# Patient Record
Sex: MALE | Race: WHITE | NOT HISPANIC OR LATINO | URBAN - METROPOLITAN AREA
[De-identification: names, ages, dates, MRNs, and addresses within clinical notes are randomized per-mention and may not be internally consistent; named-entity substitution may affect disease eponyms.]

---

## 2019-11-06 ENCOUNTER — TELEPHONE (OUTPATIENT)
Dept: UROLOGY | Facility: CLINIC | Age: 84
End: 2019-11-06

## 2019-11-06 NOTE — TELEPHONE ENCOUNTER
Spoke with pt and pt verbalized concern in regards to getting suprapubic mosquera changed per standing order once a month. Pt has exhibiting appointment approximately 2 months from now but showed concern for waiting too long. Changed pt appointment to a sooner date next week to have mosquera changed.

## 2019-11-06 NOTE — TELEPHONE ENCOUNTER
----- Message from Stephanie Cooper sent at 11/5/2019  3:27 PM CST -----  Contact: pt   Type:  Sooner Apoointment Request    Caller is requesting a sooner appointment.  Caller declined first available appointment listed below.  Caller will not accept being placed on the waitlist and is requesting a message be sent to doctor.  Name of Caller:pt   When is the first available appointment?11/22/2019  Symptoms:need to get catheter change   Would the patient rather a call back or a response via [facility]? Call back   Best Call Back Number:[phone]   Additional Information:

## 2019-11-08 ENCOUNTER — TELEPHONE (OUTPATIENT)
Dept: UROLOGY | Facility: CLINIC | Age: 84
End: 2019-11-08

## 2019-11-08 NOTE — TELEPHONE ENCOUNTER
----- Message from Awilda Cox sent at 11/7/2019 12:03 PM CST -----  Contact: Home Health Nurse-Mr. Morrow  Would like to consult with nurse regarding results from urine. Mr. Morrow states may be a possible UTI. Please call back at [phone]

## 2019-11-22 ENCOUNTER — PROCEDURE VISIT (OUTPATIENT)
Dept: UROLOGY | Facility: CLINIC | Age: 84
End: 2019-11-22
Payer: MEDICARE

## 2019-11-22 DIAGNOSIS — Z43.5 ENCOUNTER FOR CARE OR REPLACEMENT OF SUPRAPUBIC TUBE: Primary | ICD-10-CM

## 2019-11-22 DIAGNOSIS — N31.9 NEUROGENIC BLADDER: ICD-10-CM

## 2019-11-22 PROCEDURE — 51710 CYSTOSCOPY: ICD-10-PCS | Mod: S$GLB,,, | Performed by: SPECIALIST

## 2019-11-22 PROCEDURE — 51710 CHANGE OF BLADDER TUBE: CPT | Mod: S$GLB,,, | Performed by: SPECIALIST

## 2019-11-22 NOTE — PROGRESS NOTES
"Cystoscopy  Date/Time: 11/22/2019 1:40 PM  Performed by: Francisco Kee MD  Authorized by: Francisco Kee MD     Consent Done?:  Yes (Written)  Time out: Immediately prior to procedure a "time out" was called to verify the correct patient, procedure, equipment, support staff and site/side marked as required.    Position:  Supine  Anesthesia:  Intraurethral instillation  Preparation: Patient was prepped and draped in usual sterile fashion      Scope type:  Flexible cystoscope  Urology - Stent Inserted: Suprapubic catheter inserted.    Bladder normal: Yes      Patient tolerance:  Patient tolerated the procedure well with no immediate complications     We perform cystoscopic evaluation of the bladder through a suprapubic tract.  Passed a wire into the bladder and advanced a Councill tip catheter over the wire.          "

## 2019-11-22 NOTE — PROCEDURES
"Cystoscopy  Date/Time: 11/22/2019 1:40 PM  Performed by: Francisco Kee MD  Authorized by: Francisco Kee MD     Consent Done?:  Yes (Written)  Time out: Immediately prior to procedure a "time out" was called to verify the correct patient, procedure, equipment, support staff and site/side marked as required.    Position:  Supine  Anesthesia:  Intraurethral instillation  Preparation: Patient was prepped and draped in usual sterile fashion      Scope type:  Flexible cystoscope  Stent inserted: No    External exam normal: Yes    Normal bladder: Cystoscopy was performed through a suprapubic tract.      Patient tolerance:  Patient tolerated the procedure well with no immediate complications     The flexible 16.5 Guyanese scope was introduced through a suprapubic tract.  The tract was trended.  We got into the bladder.  We advanced a wire into the bladder. Scope was removed.  At this time a Councill tip catheter was advanced over the wire directly into the bladder. It was inflated.  He tolerated procedure no complications.      "

## 2020-01-03 ENCOUNTER — CLINICAL SUPPORT (OUTPATIENT)
Dept: UROLOGY | Facility: CLINIC | Age: 85
End: 2020-01-03
Payer: MEDICARE

## 2020-01-03 PROCEDURE — 99499 NO LOS: ICD-10-PCS | Mod: S$GLB,,, | Performed by: SPECIALIST

## 2020-01-03 PROCEDURE — 51702 PR INSERTION OF TEMPORARY INDWELLING BLADDER CATHETER, SIMPLE: ICD-10-PCS | Mod: S$GLB,,, | Performed by: SPECIALIST

## 2020-01-03 PROCEDURE — 51702 INSERT TEMP BLADDER CATH: CPT | Mod: S$GLB,,, | Performed by: SPECIALIST

## 2020-01-03 PROCEDURE — 99499 UNLISTED E&M SERVICE: CPT | Mod: S$GLB,,, | Performed by: SPECIALIST

## 2020-01-03 NOTE — PROGRESS NOTES
16 F SP mosquera change. Patient tolerated well. Educated patient on aftercare. Patient verbalizes understanding. Area clean, intact, and dry.

## 2020-02-03 ENCOUNTER — CLINICAL SUPPORT (OUTPATIENT)
Dept: UROLOGY | Facility: CLINIC | Age: 85
End: 2020-02-03
Payer: MEDICARE

## 2020-02-03 NOTE — PROGRESS NOTES
Pt came for SP change. After pt was on exam table and room set up guide wire placed thru current mosquera. Bulb deflated and removed leaving guide wire in place. 16f with tip cut off placed over guide wire, checked placement, bulb inflated, guide wire removed. SP mosquera change complete. Pt tolerated procedure well. Gave 1 mth nurse vist appt for next change.

## 2020-02-04 ENCOUNTER — TELEPHONE (OUTPATIENT)
Dept: UROLOGY | Facility: CLINIC | Age: 85
End: 2020-02-04

## 2020-02-10 ENCOUNTER — TELEPHONE (OUTPATIENT)
Dept: UROLOGY | Facility: CLINIC | Age: 85
End: 2020-02-10

## 2020-02-10 NOTE — TELEPHONE ENCOUNTER
Pt contacted clinic regarding dark colored urine with some red clots . Advised pt that this is normal after a SP change and to increase fluids for the dark urine. States that if it has not gotten better by Wed that he would call back, Nurse verbalized understanding. NB

## 2020-03-02 ENCOUNTER — TELEPHONE (OUTPATIENT)
Dept: UROLOGY | Facility: CLINIC | Age: 85
End: 2020-03-02

## 2020-03-02 NOTE — TELEPHONE ENCOUNTER
----- Message from Vicky Rocha sent at 3/2/2020  9:49 AM CST -----  Contact: Patient wife-  Patient wife called in regards to cancel patient appointment for tomorrow . Please call back at [phone].      Thanks,  Vicky Rocha

## 2020-03-02 NOTE — TELEPHONE ENCOUNTER
Spoke with wife, pt is hospitalized at [facility] so can not come to his appt tomorrow, she will call us when he gets out of the hospital

## 2020-03-05 ENCOUNTER — TELEPHONE (OUTPATIENT)
Dept: UROLOGY | Facility: CLINIC | Age: 85
End: 2020-03-05